# Patient Record
Sex: MALE | Race: WHITE | Employment: FULL TIME | URBAN - METROPOLITAN AREA
[De-identification: names, ages, dates, MRNs, and addresses within clinical notes are randomized per-mention and may not be internally consistent; named-entity substitution may affect disease eponyms.]

---

## 2018-08-05 ENCOUNTER — HOSPITAL ENCOUNTER (OUTPATIENT)
Age: 53
Discharge: HOME | End: 2018-08-05
Payer: COMMERCIAL

## 2018-08-05 PROCEDURE — 93005 ELECTROCARDIOGRAM TRACING: CPT | Performed by: NURSE PRACTITIONER

## 2018-08-06 PROCEDURE — 93010 ELECTROCARDIOGRAM REPORT: CPT | Performed by: INTERNAL MEDICINE

## 2018-09-21 LAB
EKG ATRIAL RATE: 61 BPM
EKG DIAGNOSIS: NORMAL
EKG P AXIS: 57 DEGREES
EKG P-R INTERVAL: 150 MS
EKG Q-T INTERVAL: 374 MS
EKG QRS DURATION: 84 MS
EKG QTC CALCULATION (BAZETT): 376 MS
EKG R AXIS: -13 DEGREES
EKG T AXIS: 21 DEGREES
EKG VENTRICULAR RATE: 61 BPM

## 2021-09-01 ENCOUNTER — APPOINTMENT (OUTPATIENT)
Dept: CT IMAGING | Age: 56
End: 2021-09-01
Payer: COMMERCIAL

## 2021-09-01 ENCOUNTER — APPOINTMENT (OUTPATIENT)
Dept: GENERAL RADIOLOGY | Age: 56
End: 2021-09-01
Payer: COMMERCIAL

## 2021-09-01 ENCOUNTER — HOSPITAL ENCOUNTER (EMERGENCY)
Age: 56
Discharge: HOME | End: 2021-09-01
Attending: STUDENT IN AN ORGANIZED HEALTH CARE EDUCATION/TRAINING PROGRAM
Payer: COMMERCIAL

## 2021-09-01 VITALS
OXYGEN SATURATION: 94 % | DIASTOLIC BLOOD PRESSURE: 96 MMHG | HEART RATE: 87 BPM | TEMPERATURE: 97.9 F | RESPIRATION RATE: 18 BRPM | SYSTOLIC BLOOD PRESSURE: 141 MMHG

## 2021-09-01 DIAGNOSIS — U07.1 PNEUMONIA DUE TO COVID-19 VIRUS: Primary | ICD-10-CM

## 2021-09-01 DIAGNOSIS — J12.82 PNEUMONIA DUE TO COVID-19 VIRUS: Primary | ICD-10-CM

## 2021-09-01 LAB
A/G RATIO: 1.2 (ref 1.1–2.2)
ALBUMIN SERPL-MCNC: 4.2 G/DL (ref 3.4–5)
ALP BLD-CCNC: 101 U/L (ref 40–129)
ALT SERPL-CCNC: 50 U/L (ref 10–40)
ANION GAP SERPL CALCULATED.3IONS-SCNC: 14 MMOL/L (ref 3–16)
AST SERPL-CCNC: 39 U/L (ref 15–37)
BASOPHILS ABSOLUTE: 0 K/UL (ref 0–0.2)
BASOPHILS RELATIVE PERCENT: 0.4 %
BILIRUB SERPL-MCNC: 0.5 MG/DL (ref 0–1)
BUN BLDV-MCNC: 9 MG/DL (ref 7–20)
CALCIUM SERPL-MCNC: 9.4 MG/DL (ref 8.3–10.6)
CHLORIDE BLD-SCNC: 99 MMOL/L (ref 99–110)
CO2: 26 MMOL/L (ref 21–32)
CREAT SERPL-MCNC: 0.8 MG/DL (ref 0.9–1.3)
EOSINOPHILS ABSOLUTE: 0.1 K/UL (ref 0–0.6)
EOSINOPHILS RELATIVE PERCENT: 1.6 %
GFR AFRICAN AMERICAN: >60
GFR NON-AFRICAN AMERICAN: >60
GLOBULIN: 3.6 G/DL
GLUCOSE BLD-MCNC: 177 MG/DL (ref 70–99)
HCT VFR BLD CALC: 44.3 % (ref 40.5–52.5)
HEMOGLOBIN: 14.7 G/DL (ref 13.5–17.5)
LYMPHOCYTES ABSOLUTE: 1 K/UL (ref 1–5.1)
LYMPHOCYTES RELATIVE PERCENT: 13.8 %
MCH RBC QN AUTO: 28.7 PG (ref 26–34)
MCHC RBC AUTO-ENTMCNC: 33.2 G/DL (ref 31–36)
MCV RBC AUTO: 86.4 FL (ref 80–100)
MONOCYTES ABSOLUTE: 0.8 K/UL (ref 0–1.3)
MONOCYTES RELATIVE PERCENT: 11.4 %
NEUTROPHILS ABSOLUTE: 5.1 K/UL (ref 1.7–7.7)
NEUTROPHILS RELATIVE PERCENT: 72.8 %
PDW BLD-RTO: 12.8 % (ref 12.4–15.4)
PLATELET # BLD: 286 K/UL (ref 135–450)
PMV BLD AUTO: 8.2 FL (ref 5–10.5)
POTASSIUM REFLEX MAGNESIUM: 4.2 MMOL/L (ref 3.5–5.1)
RBC # BLD: 5.12 M/UL (ref 4.2–5.9)
SODIUM BLD-SCNC: 139 MMOL/L (ref 136–145)
TOTAL PROTEIN: 7.8 G/DL (ref 6.4–8.2)
WBC # BLD: 7 K/UL (ref 4–11)

## 2021-09-01 PROCEDURE — 85025 COMPLETE CBC W/AUTO DIFF WBC: CPT

## 2021-09-01 PROCEDURE — 80053 COMPREHEN METABOLIC PANEL: CPT

## 2021-09-01 PROCEDURE — 96375 TX/PRO/DX INJ NEW DRUG ADDON: CPT

## 2021-09-01 PROCEDURE — 71260 CT THORAX DX C+: CPT

## 2021-09-01 PROCEDURE — 6360000004 HC RX CONTRAST MEDICATION: Performed by: STUDENT IN AN ORGANIZED HEALTH CARE EDUCATION/TRAINING PROGRAM

## 2021-09-01 PROCEDURE — 2580000003 HC RX 258: Performed by: STUDENT IN AN ORGANIZED HEALTH CARE EDUCATION/TRAINING PROGRAM

## 2021-09-01 PROCEDURE — 96374 THER/PROPH/DIAG INJ IV PUSH: CPT

## 2021-09-01 PROCEDURE — 99284 EMERGENCY DEPT VISIT MOD MDM: CPT

## 2021-09-01 PROCEDURE — 71045 X-RAY EXAM CHEST 1 VIEW: CPT

## 2021-09-01 PROCEDURE — 6360000002 HC RX W HCPCS: Performed by: STUDENT IN AN ORGANIZED HEALTH CARE EDUCATION/TRAINING PROGRAM

## 2021-09-01 RX ORDER — ATORVASTATIN CALCIUM 20 MG/1
20 TABLET, FILM COATED ORAL DAILY
COMMUNITY

## 2021-09-01 RX ORDER — ONDANSETRON 2 MG/ML
4 INJECTION INTRAMUSCULAR; INTRAVENOUS ONCE
Status: COMPLETED | OUTPATIENT
Start: 2021-09-01 | End: 2021-09-01

## 2021-09-01 RX ORDER — ONDANSETRON 4 MG/1
4 TABLET, ORALLY DISINTEGRATING ORAL EVERY 8 HOURS PRN
Qty: 9 TABLET | Refills: 0 | Status: SHIPPED | OUTPATIENT
Start: 2021-09-01 | End: 2021-09-04

## 2021-09-01 RX ORDER — KETOROLAC TROMETHAMINE 30 MG/ML
15 INJECTION, SOLUTION INTRAMUSCULAR; INTRAVENOUS ONCE
Status: COMPLETED | OUTPATIENT
Start: 2021-09-01 | End: 2021-09-01

## 2021-09-01 RX ORDER — 0.9 % SODIUM CHLORIDE 0.9 %
1000 INTRAVENOUS SOLUTION INTRAVENOUS ONCE
Status: COMPLETED | OUTPATIENT
Start: 2021-09-01 | End: 2021-09-01

## 2021-09-01 RX ADMIN — IOPAMIDOL 75 ML: 755 INJECTION, SOLUTION INTRAVENOUS at 13:16

## 2021-09-01 RX ADMIN — SODIUM CHLORIDE 1000 ML: 9 INJECTION, SOLUTION INTRAVENOUS at 12:11

## 2021-09-01 RX ADMIN — ONDANSETRON HYDROCHLORIDE 4 MG: 2 INJECTION, SOLUTION INTRAMUSCULAR; INTRAVENOUS at 12:10

## 2021-09-01 RX ADMIN — KETOROLAC TROMETHAMINE 15 MG: 30 INJECTION, SOLUTION INTRAMUSCULAR; INTRAVENOUS at 12:10

## 2021-09-01 ASSESSMENT — PAIN SCALES - GENERAL
PAINLEVEL_OUTOF10: 3
PAINLEVEL_OUTOF10: 4

## 2021-09-01 NOTE — ED PROVIDER NOTES
Activity:     Days of Exercise per Week:     Minutes of Exercise per Session:    Stress:     Feeling of Stress :    Social Connections:     Frequency of Communication with Friends and Family:     Frequency of Social Gatherings with Friends and Family:     Attends Restorationism Services:     Active Member of Clubs or Organizations:     Attends Club or Organization Meetings:     Marital Status:    Intimate Partner Violence:     Fear of Current or Ex-Partner:     Emotionally Abused:     Physically Abused:     Sexually Abused:      No current facility-administered medications for this encounter. Current Outpatient Medications   Medication Sig Dispense Refill    atorvastatin (LIPITOR) 20 MG tablet Take 20 mg by mouth daily      ondansetron (ZOFRAN ODT) 4 MG disintegrating tablet Take 1 tablet by mouth every 8 hours as needed for Nausea or Vomiting 9 tablet 0     No Known Allergies    REVIEW OF SYSTEMS  10 systems reviewed, pertinent positives per HPI otherwise noted to be negative. PHYSICAL EXAM  BP (!) 148/101   Pulse 88   Temp 97.9 °F (36.6 °C) (Temporal)   Resp 18   SpO2 95%    GENERAL APPEARANCE: Awake and alert. Cooperative. No acute distress. HENT: Normocephalic. Atraumatic. Mucous membranes are moist.  NECK: Supple. EYES: PERRL. EOM's grossly intact. HEART/CHEST: RRR. No murmurs. LUNGS: Respirations unlabored. CTAB. Good air exchange. Speaking comfortably in full sentences. ABDOMEN: No tenderness. Soft. Non-distended. No masses. No organomegaly. No guarding or rebound. MUSCULOSKELETAL: No extremity edema. Compartments soft. No deformity. No tenderness in the extremities. All extremities neurovascularly intact. SKIN: Warm and dry. No acute rashes. NEUROLOGICAL: Alert and oriented. CN's 2-12 intact. No gross facial drooping. Strength 5/5, sensation intact. PSYCHIATRIC: Normal mood and affect. LABS  I have reviewed all labs for this visit.    Results for orders placed or performed during the hospital encounter of 09/01/21   CBC Auto Differential   Result Value Ref Range    WBC 7.0 4.0 - 11.0 K/uL    RBC 5.12 4.20 - 5.90 M/uL    Hemoglobin 14.7 13.5 - 17.5 g/dL    Hematocrit 44.3 40.5 - 52.5 %    MCV 86.4 80.0 - 100.0 fL    MCH 28.7 26.0 - 34.0 pg    MCHC 33.2 31.0 - 36.0 g/dL    RDW 12.8 12.4 - 15.4 %    Platelets 834 937 - 872 K/uL    MPV 8.2 5.0 - 10.5 fL    Neutrophils % 72.8 %    Lymphocytes % 13.8 %    Monocytes % 11.4 %    Eosinophils % 1.6 %    Basophils % 0.4 %    Neutrophils Absolute 5.1 1.7 - 7.7 K/uL    Lymphocytes Absolute 1.0 1.0 - 5.1 K/uL    Monocytes Absolute 0.8 0.0 - 1.3 K/uL    Eosinophils Absolute 0.1 0.0 - 0.6 K/uL    Basophils Absolute 0.0 0.0 - 0.2 K/uL   Comprehensive Metabolic Panel w/ Reflex to MG   Result Value Ref Range    Sodium 139 136 - 145 mmol/L    Potassium reflex Magnesium 4.2 3.5 - 5.1 mmol/L    Chloride 99 99 - 110 mmol/L    CO2 26 21 - 32 mmol/L    Anion Gap 14 3 - 16    Glucose 177 (H) 70 - 99 mg/dL    BUN 9 7 - 20 mg/dL    CREATININE 0.8 (L) 0.9 - 1.3 mg/dL    GFR Non-African American >60 >60    GFR African American >60 >60    Calcium 9.4 8.3 - 10.6 mg/dL    Total Protein 7.8 6.4 - 8.2 g/dL    Albumin 4.2 3.4 - 5.0 g/dL    Albumin/Globulin Ratio 1.2 1.1 - 2.2    Total Bilirubin 0.5 0.0 - 1.0 mg/dL    Alkaline Phosphatase 101 40 - 129 U/L    ALT 50 (H) 10 - 40 U/L    AST 39 (H) 15 - 37 U/L    Globulin 3.6 g/dL     RADIOLOGY  CT CHEST W CONTRAST   Final Result   Moderate multifocal patchy airspace disease most typical of multifocal   pneumonia. This would include viral pneumonia given the pattern and   appearance. These findings are not typical of septic emboli. XR CHEST PORTABLE   Final Result   Multifocal opacities throughout both lungs likely represent multifocal   pneumonia, to include atypical viral causes.   However as some of the   opacities have a nodular appearance, recommend further characterization with   a dedicated chest CT, preferably contrast, to evaluate for possible septic   emboli or true pulmonary nodules. ED COURSE/MDM  Patient seen and evaluated. Old records reviewed. Labs and imaging reviewed and results discussed with patient. Patient is a 44-year-old male, with recent diagnosis of Covid, presenting with concerns for ongoing generalized malaise, decreased appetite, nausea. Full HPI as detailed above. Upon arrival in the ED, vitals reassuring. Patient is resting comfortably is in no acute distress. Heart regular rate and rhythm. Lungs clear to auscultation bilaterally. Not hypoxic in the department. Labs were performed and reassuring. No leukocytosis or anemia. No oxygen requirement. Chest x-ray with multifocal opacities consistent with multifocal pneumonia, consistent with patient's history of Covid. There was a nodular appearance of the opacities for which a dedicated CT chest was recommended, this did not show any evidence of septic emboli but did show ongoing findings of multifocal pneumonia do not suspect a bacterial component at this time, patient is still well within the time limit to be having symptoms from his Covid. Patient was treated with IV fluids, as well as Zofran. Was also given Toradol. Upon reassessment, patient had improvement of his symptoms and stated that he was feeling better. He will be sent with a pulse oximeter for use at home, advised to return to the ED if oxygen saturation's are less than 90%. Advised to continue taking his medications as prescribed, will also be sent with a prescription for Zofran for nausea. Patient is comfortable agreement with plan of care. Given strict return precautions for the ED. I did  him on obtaining Covid vaccination when he is eligible. Patient is comfortable agreement with plan of care and will be discharged home. Given return precautions.     During the patient's ED course, the patient was given:  Medications ketorolac (TORADOL) injection 15 mg (15 mg IntraVENous Given 9/1/21 1210)   ondansetron (ZOFRAN) injection 4 mg (4 mg IntraVENous Given 9/1/21 1210)   0.9 % sodium chloride bolus (0 mLs IntraVENous Stopped 9/1/21 1326)   iopamidol (ISOVUE-370) 76 % injection 75 mL (75 mLs IntraVENous Given 9/1/21 1316)        CLINICAL IMPRESSION  1. Pneumonia due to COVID-19 virus        Blood pressure (!) 148/101, pulse 88, temperature 97.9 °F (36.6 °C), temperature source Temporal, resp. rate 18, SpO2 95 %. DISPOSITION  Emeka Young was discharged to home in good condition. Patient was given scripts for the following medications. I counseled patient how to take these medications. New Prescriptions    ONDANSETRON (ZOFRAN ODT) 4 MG DISINTEGRATING TABLET    Take 1 tablet by mouth every 8 hours as needed for Nausea or Vomiting       Follow-up with:  Killian Ortega  [phone]  Schedule an appointment as soon as possible for a visit       [place] [facility] [place] [facility]  [address] [facility] [address]  Go to   If symptoms worsen      DISCLAIMER: This chart was created using Dragon dictation software. Efforts were made by me to ensure accuracy, however some errors may be present due to limitations of this technology and occasionally words are not transcribed correctly.        Mirta Aguirre MD  09/01/21 3537

## 2021-09-01 NOTE — ED TRIAGE NOTES
Pt started feeling sick on the 17th. Pt was diagnosed with COVID on the 23rd. Pt states that he has had increased SOB and cough, just doesn't feel like he is getting any better,.

## 2021-09-02 ENCOUNTER — CARE COORDINATION (OUTPATIENT)
Dept: CASE MANAGEMENT | Age: 56
End: 2021-09-02

## 2021-09-03 ENCOUNTER — CARE COORDINATION (OUTPATIENT)
Dept: CASE MANAGEMENT | Age: 56
End: 2021-09-03

## 2022-04-29 ENCOUNTER — APPOINTMENT (OUTPATIENT)
Dept: GENERAL RADIOLOGY | Age: 57
End: 2022-04-29
Payer: COMMERCIAL

## 2022-04-29 ENCOUNTER — HOSPITAL ENCOUNTER (OUTPATIENT)
Age: 57
Setting detail: OBSERVATION
Discharge: HOME | End: 2022-04-30
Attending: EMERGENCY MEDICINE | Admitting: INTERNAL MEDICINE
Payer: COMMERCIAL

## 2022-04-29 DIAGNOSIS — R07.9 CHEST PAIN, UNSPECIFIED TYPE: Primary | ICD-10-CM

## 2022-04-29 PROBLEM — K21.9 GERD (GASTROESOPHAGEAL REFLUX DISEASE): Status: ACTIVE | Noted: 2022-04-29

## 2022-04-29 PROBLEM — E66.9 OBESITY (BMI 30-39.9): Status: ACTIVE | Noted: 2022-04-29

## 2022-04-29 PROBLEM — E78.5 HLD (HYPERLIPIDEMIA): Status: ACTIVE | Noted: 2022-04-29

## 2022-04-29 PROBLEM — J44.9 OBSTRUCTIVE LUNG DISEASE (GENERALIZED) (HCC): Status: ACTIVE | Noted: 2022-04-29

## 2022-04-29 LAB
A/G RATIO: 1.7 (ref 1.1–2.2)
ALBUMIN SERPL-MCNC: 4.6 G/DL (ref 3.4–5)
ALP BLD-CCNC: 67 U/L (ref 40–129)
ALT SERPL-CCNC: 26 U/L (ref 10–40)
ANION GAP SERPL CALCULATED.3IONS-SCNC: 9 MMOL/L (ref 3–16)
AST SERPL-CCNC: 19 U/L (ref 15–37)
BASOPHILS ABSOLUTE: 0 K/UL (ref 0–0.2)
BASOPHILS RELATIVE PERCENT: 0.8 %
BILIRUB SERPL-MCNC: <0.2 MG/DL (ref 0–1)
BUN BLDV-MCNC: 18 MG/DL (ref 7–20)
CALCIUM SERPL-MCNC: 10 MG/DL (ref 8.3–10.6)
CHLORIDE BLD-SCNC: 98 MMOL/L (ref 99–110)
CO2: 25 MMOL/L (ref 21–32)
CREAT SERPL-MCNC: 0.8 MG/DL (ref 0.9–1.3)
EOSINOPHILS ABSOLUTE: 0.2 K/UL (ref 0–0.6)
EOSINOPHILS RELATIVE PERCENT: 2.7 %
GFR AFRICAN AMERICAN: >60
GFR NON-AFRICAN AMERICAN: >60
GLUCOSE BLD-MCNC: 224 MG/DL (ref 70–99)
HCT VFR BLD CALC: 41.8 % (ref 40.5–52.5)
HEMOGLOBIN: 14.1 G/DL (ref 13.5–17.5)
LYMPHOCYTES ABSOLUTE: 2.2 K/UL (ref 1–5.1)
LYMPHOCYTES RELATIVE PERCENT: 36.6 %
MCH RBC QN AUTO: 28.6 PG (ref 26–34)
MCHC RBC AUTO-ENTMCNC: 33.8 G/DL (ref 31–36)
MCV RBC AUTO: 84.5 FL (ref 80–100)
MONOCYTES ABSOLUTE: 0.5 K/UL (ref 0–1.3)
MONOCYTES RELATIVE PERCENT: 8.4 %
NEUTROPHILS ABSOLUTE: 3.1 K/UL (ref 1.7–7.7)
NEUTROPHILS RELATIVE PERCENT: 51.5 %
PDW BLD-RTO: 13.5 % (ref 12.4–15.4)
PLATELET # BLD: 261 K/UL (ref 135–450)
PMV BLD AUTO: 8.4 FL (ref 5–10.5)
POTASSIUM SERPL-SCNC: 4.2 MMOL/L (ref 3.5–5.1)
RBC # BLD: 4.94 M/UL (ref 4.2–5.9)
SODIUM BLD-SCNC: 132 MMOL/L (ref 136–145)
TOTAL PROTEIN: 7.3 G/DL (ref 6.4–8.2)
TROPONIN: <0.01 NG/ML
WBC # BLD: 6 K/UL (ref 4–11)

## 2022-04-29 PROCEDURE — 80053 COMPREHEN METABOLIC PANEL: CPT

## 2022-04-29 PROCEDURE — 71046 X-RAY EXAM CHEST 2 VIEWS: CPT

## 2022-04-29 PROCEDURE — 85025 COMPLETE CBC W/AUTO DIFF WBC: CPT

## 2022-04-29 PROCEDURE — 93005 ELECTROCARDIOGRAM TRACING: CPT | Performed by: EMERGENCY MEDICINE

## 2022-04-29 PROCEDURE — G0378 HOSPITAL OBSERVATION PER HR: HCPCS

## 2022-04-29 PROCEDURE — 99285 EMERGENCY DEPT VISIT HI MDM: CPT

## 2022-04-29 PROCEDURE — 1200000000 HC SEMI PRIVATE

## 2022-04-29 PROCEDURE — 84484 ASSAY OF TROPONIN QUANT: CPT

## 2022-04-29 PROCEDURE — 6370000000 HC RX 637 (ALT 250 FOR IP): Performed by: INTERNAL MEDICINE

## 2022-04-29 PROCEDURE — 6370000000 HC RX 637 (ALT 250 FOR IP): Performed by: PHYSICIAN ASSISTANT

## 2022-04-29 PROCEDURE — 2580000003 HC RX 258: Performed by: INTERNAL MEDICINE

## 2022-04-29 RX ORDER — ENOXAPARIN SODIUM 100 MG/ML
40 INJECTION SUBCUTANEOUS DAILY
Status: DISCONTINUED | OUTPATIENT
Start: 2022-04-30 | End: 2022-04-30 | Stop reason: HOSPADM

## 2022-04-29 RX ORDER — NITROGLYCERIN 0.4 MG/1
0.4 TABLET SUBLINGUAL EVERY 5 MIN PRN
Status: DISCONTINUED | OUTPATIENT
Start: 2022-04-29 | End: 2022-04-30 | Stop reason: HOSPADM

## 2022-04-29 RX ORDER — SODIUM CHLORIDE 0.9 % (FLUSH) 0.9 %
5-40 SYRINGE (ML) INJECTION PRN
Status: DISCONTINUED | OUTPATIENT
Start: 2022-04-29 | End: 2022-04-30 | Stop reason: HOSPADM

## 2022-04-29 RX ORDER — ATORVASTATIN CALCIUM 10 MG/1
20 TABLET, FILM COATED ORAL NIGHTLY
Status: DISCONTINUED | OUTPATIENT
Start: 2022-04-29 | End: 2022-04-30 | Stop reason: HOSPADM

## 2022-04-29 RX ORDER — ACETAMINOPHEN 650 MG/1
650 SUPPOSITORY RECTAL EVERY 6 HOURS PRN
Status: DISCONTINUED | OUTPATIENT
Start: 2022-04-29 | End: 2022-04-30 | Stop reason: HOSPADM

## 2022-04-29 RX ORDER — ASPIRIN 81 MG/1
81 TABLET ORAL DAILY
COMMUNITY

## 2022-04-29 RX ORDER — SODIUM CHLORIDE 0.9 % (FLUSH) 0.9 %
5-40 SYRINGE (ML) INJECTION EVERY 12 HOURS SCHEDULED
Status: DISCONTINUED | OUTPATIENT
Start: 2022-04-29 | End: 2022-04-30 | Stop reason: HOSPADM

## 2022-04-29 RX ORDER — SODIUM CHLORIDE 9 MG/ML
INJECTION, SOLUTION INTRAVENOUS PRN
Status: DISCONTINUED | OUTPATIENT
Start: 2022-04-29 | End: 2022-04-30 | Stop reason: HOSPADM

## 2022-04-29 RX ORDER — ALBUTEROL SULFATE 2.5 MG/3ML
2.5 SOLUTION RESPIRATORY (INHALATION)
Status: DISCONTINUED | OUTPATIENT
Start: 2022-04-30 | End: 2022-04-30 | Stop reason: HOSPADM

## 2022-04-29 RX ORDER — ALBUTEROL SULFATE 2.5 MG/3ML
2.5 SOLUTION RESPIRATORY (INHALATION) EVERY 4 HOURS PRN
Status: DISCONTINUED | OUTPATIENT
Start: 2022-04-29 | End: 2022-04-30 | Stop reason: HOSPADM

## 2022-04-29 RX ORDER — ASPIRIN 81 MG/1
81 TABLET ORAL DAILY
Status: DISCONTINUED | OUTPATIENT
Start: 2022-04-30 | End: 2022-04-30 | Stop reason: HOSPADM

## 2022-04-29 RX ORDER — ACETAMINOPHEN 325 MG/1
650 TABLET ORAL EVERY 6 HOURS PRN
Status: DISCONTINUED | OUTPATIENT
Start: 2022-04-29 | End: 2022-04-30 | Stop reason: HOSPADM

## 2022-04-29 RX ORDER — ASPIRIN 81 MG/1
324 TABLET, CHEWABLE ORAL ONCE
Status: COMPLETED | OUTPATIENT
Start: 2022-04-29 | End: 2022-04-29

## 2022-04-29 RX ORDER — PANTOPRAZOLE SODIUM 40 MG/1
40 TABLET, DELAYED RELEASE ORAL DAILY
Status: DISCONTINUED | OUTPATIENT
Start: 2022-04-30 | End: 2022-04-30 | Stop reason: HOSPADM

## 2022-04-29 RX ORDER — ONDANSETRON 2 MG/ML
4 INJECTION INTRAMUSCULAR; INTRAVENOUS EVERY 6 HOURS PRN
Status: DISCONTINUED | OUTPATIENT
Start: 2022-04-29 | End: 2022-04-30 | Stop reason: HOSPADM

## 2022-04-29 RX ORDER — POLYETHYLENE GLYCOL 3350 17 G/17G
17 POWDER, FOR SOLUTION ORAL DAILY PRN
Status: DISCONTINUED | OUTPATIENT
Start: 2022-04-29 | End: 2022-04-30 | Stop reason: HOSPADM

## 2022-04-29 RX ORDER — PANTOPRAZOLE SODIUM 40 MG/1
40 TABLET, DELAYED RELEASE ORAL DAILY
COMMUNITY

## 2022-04-29 RX ORDER — ONDANSETRON 4 MG/1
4 TABLET, ORALLY DISINTEGRATING ORAL EVERY 8 HOURS PRN
Status: DISCONTINUED | OUTPATIENT
Start: 2022-04-29 | End: 2022-04-30 | Stop reason: HOSPADM

## 2022-04-29 RX ADMIN — ATORVASTATIN CALCIUM 20 MG: 10 TABLET, FILM COATED ORAL at 23:40

## 2022-04-29 RX ADMIN — SODIUM CHLORIDE, PRESERVATIVE FREE 10 ML: 5 INJECTION INTRAVENOUS at 23:40

## 2022-04-29 RX ADMIN — ASPIRIN 81 MG 324 MG: 81 TABLET ORAL at 20:33

## 2022-04-29 ASSESSMENT — PAIN - FUNCTIONAL ASSESSMENT
PAIN_FUNCTIONAL_ASSESSMENT: NONE - DENIES PAIN
PAIN_FUNCTIONAL_ASSESSMENT: NONE - DENIES PAIN

## 2022-04-29 NOTE — ED PROVIDER NOTES
[facility]  ED      CHIEF COMPLAINT  Chest Pain (per pt x2 days ago center of chest radiates up into throat/head/sent in by PCP)      [address]  I have seen and evaluated this patient with my supervising physician, Dr. Bee Nagel. HISTORY OF PRESENT ILLNESS  Rodrigo Roe is a 49 y.o. male history HTN, HLD, BMI 28, family history of cardiac disease; presents emergency department for evaluation of chest pain. Patient reports of the past few weeks he had a chest cold which seem to have resolved however over the past few days he noticed intermittent chest pain that starts in the center of his chest and radiates up towards his neck and head. 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 Chest tightness does seem to improve at rest.  Patient states when he is having this tightness he feels like he cannot take a full breath. He went to urgent care today who referred him to the ED. No recent flights, surgeries, hemoptysis or unilateral leg swelling. No prior cardiac evaluation. This moment at rest patient is chest pain-free no other complaints, modifying factors or associated symptoms. Nursing notes reviewed. Past Medical History:   Diagnosis Date    Hyperlipidemia     Hypertension      Past Surgical History:   Procedure Laterality Date    APPENDECTOMY      VASECTOMY       History reviewed. No pertinent family history.   Social History     Socioeconomic History    Marital status:      Spouse name: Not on file    Number of children: Not on file    Years of education: Not on file    Highest education level: Not on file   Occupational History    Not on file   Tobacco Use    Smoking status: Never Smoker    Smokeless tobacco: Never Used   Substance and Sexual Activity    Alcohol use: Not on file     Comment: rarely    Drug use: Never    Sexual activity: Not on file   Other Topics Concern    Not on file   Social History Narrative    Not on file     Social Determinants of Health     Financial Resource Strain:     Difficulty of Paying Living Expenses: Not on file   Food Insecurity:     Worried About Running Out of Food in the Last Year: Not on file    Karen of Food in the Last Year: Not on file   Transportation Needs:     Lack of Transportation (Medical): Not on file    Lack of Transportation (Non-Medical):  Not on file   Physical Activity:     Days of Exercise per Week: Not on file    Minutes of Exercise per Session: Not on file   Stress:     Feeling of Stress : Not on file   Social Connections:     Frequency of Communication with Friends and Family: Not on file    Frequency of Social Gatherings with Friends and Family: Not on file    Attends Hinduism Services: Not on file    Active Member of [address] [place] [address] [employer] or Organizations: Not on file    Attends Club or Organization Meetings: Not on file    Marital Status: Not on file   Intimate Partner Violence:     Fear of Current or Ex-Partner: Not on file    Emotionally Abused: Not on file    Physically Abused: Not on file    Sexually Abused: Not on file   Housing Stability:     Unable to Pay for Housing in the Last Year: Not on file    Number of [place] in the Last Year: Not on file    Unstable Housing in the Last Year: Not on file     Current Facility-Administered Medications   Medication Dose Route Frequency Provider Last Rate Last Admin    aspirin chewable tablet 324 mg  324 mg Oral Once Shun Myers PA-C         Current Outpatient Medications   Medication Sig Dispense Refill    pantoprazole (PROTONIX) 40 MG tablet Take 40 mg by mouth daily      aspirin 81 MG EC tablet Take 81 mg by mouth daily      atorvastatin (LIPITOR) 20 MG tablet Take 20 mg by mouth daily       No Known Allergies    REVIEW OF SYSTEMS  10 systems reviewed, pertinent positives per HPI otherwise noted to be negative    PHYSICAL EXAM  BP (!) 149/104   Pulse 76   Temp 98.6 °F (37 °C) (Oral)   Resp 16   Ht 5' 8\" (1.727 m)   Wt 215 lb (97.5 kg)   SpO2 97%   BMI 32.69 kg/m²   GENERAL APPEARANCE: Awake and alert. Cooperative. HEAD: Normocephalic. Atraumatic. EYES: EOM's grossly intact. ENT: Mucous membranes are moist.   NECK: Supple. HEART: RRR. No murmurs. LUNGS: Respirations unlabored. CTAB. Good air exchange. Speaking comfortably in full sentences. EXTREMITIES: No peripheral edema. Moves all extremities equally. All extremities neurovascularly intact. No unilateral leg swelling  SKIN: Warm and dry. No acute rashes. NEUROLOGICAL: Alert and oriented. CN's 2-12 intact. No gross facial drooping. Strength 5/5, sensation intact. PSYCHIATRIC: Normal mood and affect. RADIOLOGY  XR CHEST (2 VW)   Final Result   The lungs are without acute focal process. There is no effusion or   pneumothorax. The cardiomediastinal silhouette is stable. The osseous   structures are stable. IMPRESSION:   No acute process. LABS  Labs Reviewed   COMPREHENSIVE METABOLIC PANEL - Abnormal; Notable for the following components:       Result Value    Sodium 132 (*)     Chloride 98 (*)     Glucose 224 (*)     CREATININE 0.8 (*)     All other components within normal limits   CBC WITH AUTO DIFFERENTIAL   TROPONIN       PROCEDURES  Unless otherwise noted below, none  Procedures       CRITICAL CARE TIME  The total critical care time spent while evaluating and treating this patient was 0 minutes. This excludes time spent doing separately billable procedures. This includes time at the bedside, data interpretation, medication management, obtaining critical history from collateral sources if the patient is unable to provide it directly, and physician consultation. Specifics of interventions taken and potentially life-threatening diagnostic considerations are listed above in the medical decision making.     MDM  MDM  72-year-old male history of HTN, HLD, BMI 28, family history of cardiac disease presents ED for evaluation of intermittent chest tightness. He does state it does seem to happen more frequently with exertion and less with rest.  No prior cardiac evaluation was referred to ED by urgent care. On arrival to ED patient is at rest chest pain-free. Hypertensive 174/108, afebrile nontachycardic oxygen saturation 97% on room air. Patient is no acute distress. Obtain lab work as well as chest x-ray. Please refer to attending note for EKG interpretation. Patient's heart score is documented below. Discussed with the patient at length including discussions with the attending physician and the patient. However given his heart score 4 no prior cardiac evaluations will plan to return to the hospital service to request admission for chest pain rule out ACS and stress testing. Patient received aspirin in the ED. HEART Score for Major Cardiac Events from [email or web address]  on 4/29/2022  ** All calculations should be rechecked by clinician prior to use **    RESULT SUMMARY:  4 points  Moderate Score (4-6 points)    Risk of MACE of 12-16.6%. INPUTS:  History --> 1 = Moderately suspicious  EKG --> 0 = Normal  Age --> 1 = 45-64  Risk factors --> 2 = ?3 risk factors or history of atherosclerotic disease  Initial troponin --> 0 = ?normal limit      DISPOSITION  Requesting admission for chest pain rule out ACS    CLINICAL IMPRESSION  1.  Chest pain, unspecified type            Myranda Jimenes PA-C  04/29/22 2031

## 2022-04-30 VITALS
OXYGEN SATURATION: 95 % | SYSTOLIC BLOOD PRESSURE: 128 MMHG | RESPIRATION RATE: 16 BRPM | DIASTOLIC BLOOD PRESSURE: 86 MMHG | HEIGHT: 68 IN | BODY MASS INDEX: 32.58 KG/M2 | WEIGHT: 215 LBS | TEMPERATURE: 97.7 F | HEART RATE: 79 BPM

## 2022-04-30 LAB
ANION GAP SERPL CALCULATED.3IONS-SCNC: 8 MMOL/L (ref 3–16)
BUN BLDV-MCNC: 15 MG/DL (ref 7–20)
CALCIUM SERPL-MCNC: 10.1 MG/DL (ref 8.3–10.6)
CHLORIDE BLD-SCNC: 103 MMOL/L (ref 99–110)
CHOLESTEROL, TOTAL: 199 MG/DL (ref 0–199)
CO2: 28 MMOL/L (ref 21–32)
CREAT SERPL-MCNC: 0.8 MG/DL (ref 0.9–1.3)
EKG ATRIAL RATE: 65 BPM
EKG ATRIAL RATE: 81 BPM
EKG DIAGNOSIS: NORMAL
EKG DIAGNOSIS: NORMAL
EKG P AXIS: 41 DEGREES
EKG P AXIS: 43 DEGREES
EKG P-R INTERVAL: 154 MS
EKG P-R INTERVAL: 158 MS
EKG Q-T INTERVAL: 348 MS
EKG Q-T INTERVAL: 382 MS
EKG QRS DURATION: 78 MS
EKG QRS DURATION: 84 MS
EKG QTC CALCULATION (BAZETT): 397 MS
EKG QTC CALCULATION (BAZETT): 404 MS
EKG R AXIS: -16 DEGREES
EKG R AXIS: -28 DEGREES
EKG T AXIS: 5 DEGREES
EKG T AXIS: 6 DEGREES
EKG VENTRICULAR RATE: 65 BPM
EKG VENTRICULAR RATE: 81 BPM
GFR AFRICAN AMERICAN: >60
GFR NON-AFRICAN AMERICAN: >60
GLUCOSE BLD-MCNC: 162 MG/DL (ref 70–99)
HCT VFR BLD CALC: 44.8 % (ref 40.5–52.5)
HDLC SERPL-MCNC: 36 MG/DL (ref 40–60)
HEMOGLOBIN: 14.8 G/DL (ref 13.5–17.5)
LDL CHOLESTEROL CALCULATED: 121 MG/DL
MCH RBC QN AUTO: 28.4 PG (ref 26–34)
MCHC RBC AUTO-ENTMCNC: 33.1 G/DL (ref 31–36)
MCV RBC AUTO: 85.7 FL (ref 80–100)
PDW BLD-RTO: 13.5 % (ref 12.4–15.4)
PLATELET # BLD: 266 K/UL (ref 135–450)
PMV BLD AUTO: 8.2 FL (ref 5–10.5)
POTASSIUM REFLEX MAGNESIUM: 4.9 MMOL/L (ref 3.5–5.1)
RBC # BLD: 5.23 M/UL (ref 4.2–5.9)
SODIUM BLD-SCNC: 139 MMOL/L (ref 136–145)
TRIGL SERPL-MCNC: 209 MG/DL (ref 0–150)
TROPONIN: <0.01 NG/ML
VLDLC SERPL CALC-MCNC: 42 MG/DL
WBC # BLD: 5.9 K/UL (ref 4–11)

## 2022-04-30 PROCEDURE — 6370000000 HC RX 637 (ALT 250 FOR IP): Performed by: INTERNAL MEDICINE

## 2022-04-30 PROCEDURE — G0378 HOSPITAL OBSERVATION PER HR: HCPCS

## 2022-04-30 PROCEDURE — 96372 THER/PROPH/DIAG INJ SC/IM: CPT

## 2022-04-30 PROCEDURE — 83036 HEMOGLOBIN GLYCOSYLATED A1C: CPT

## 2022-04-30 PROCEDURE — 36415 COLL VENOUS BLD VENIPUNCTURE: CPT

## 2022-04-30 PROCEDURE — 84484 ASSAY OF TROPONIN QUANT: CPT

## 2022-04-30 PROCEDURE — 85027 COMPLETE CBC AUTOMATED: CPT

## 2022-04-30 PROCEDURE — 80048 BASIC METABOLIC PNL TOTAL CA: CPT

## 2022-04-30 PROCEDURE — 80061 LIPID PANEL: CPT

## 2022-04-30 PROCEDURE — 93010 ELECTROCARDIOGRAM REPORT: CPT | Performed by: INTERNAL MEDICINE

## 2022-04-30 PROCEDURE — 93005 ELECTROCARDIOGRAM TRACING: CPT | Performed by: INTERNAL MEDICINE

## 2022-04-30 PROCEDURE — 6360000002 HC RX W HCPCS: Performed by: INTERNAL MEDICINE

## 2022-04-30 RX ADMIN — PANTOPRAZOLE SODIUM 40 MG: 40 TABLET, DELAYED RELEASE ORAL at 09:34

## 2022-04-30 RX ADMIN — ENOXAPARIN SODIUM 40 MG: 100 INJECTION SUBCUTANEOUS at 09:34

## 2022-04-30 RX ADMIN — ASPIRIN 81 MG: 81 TABLET, COATED ORAL at 09:34

## 2022-04-30 NOTE — H&P
Hospital Medicine History & Physical      Patient:  Stan Kemp  :   1965  MRN:   [id number]  Date of Service: 22    Chief Complaint   Patient presents with    Chest Pain     per pt x2 days ago center of chest radiates up into throat/head/sent in by PCP       HISTORY OF PRESENT ILLNESS:    Stan Kemp is a 49 y.o. male. He presented to the ER after completing his work day. He reports on and off mild central chest and neck discomfort for several days. Comes and goes spontaenously. It generally lasts for minutes but less than an hour. It does not change with activity, rest, breathing, swallowing, position, etc... Has not taken SL NTG. When it happens it feels like \"I can get just short of a good cleansing breath. \"  No flushing, diaphoresis, or nausea. Has tried albuterol which he states helps partially. He may have had childhood asthma. He seems to recall definitely having exercise-induced asthma. He is not chronically treated for either. He had COVID-19 back in . He had dyspnea, cough, and chest pain persisting long after COVID had resolved for which he was prescribed prn albuterol. He has not had PFT's. Review of Systems:  All pertinent positives and negatives are as noted in the HPI section. All other systems were reviewed and are negative. Past Medical History:   Diagnosis Date    Hyperlipidemia     Hypertension        Past Surgical History:   Procedure Laterality Date    APPENDECTOMY      VASECTOMY           Prior to Admission medications    Medication Sig Start Date End Date Taking? Authorizing Provider   pantoprazole (PROTONIX) 40 MG tablet Take 40 mg by mouth daily   Yes Historical Provider, MD   aspirin 81 MG EC tablet Take 81 mg by mouth daily   Yes Historical Provider, MD   atorvastatin (LIPITOR) 20 MG tablet Take 20 mg by mouth daily    Historical Provider, MD       Allergies:   Patient has no known allergies.     Social:   reports that he has never smoked. He has never used smokeless tobacco.   has no history on file for alcohol use. Social History     Substance and Sexual Activity   Drug Use Never       History reviewed. No pertinent family history. Father MI at 80  Mother MI at 79    PHYSICAL EXAM:  I performed this physical examination. Vitals:  Patient Vitals for the past 24 hrs:   BP Temp Temp src Pulse Resp SpO2 Height Weight   04/29/22 2003 (!) 149/104 -- -- 76 16 97 % -- --   04/29/22 1811 (!) 174/108 98.6 °F (37 °C) Oral 86 16 97 % -- --   04/29/22 1803 -- -- -- -- -- -- 5' 8\" (1.727 m) 215 lb (97.5 kg)       GEN:  Appearance:  Age appropriate male in NAD . Level of Consciousness:  alert . Orientation:  full    HEENT: Sclera anicteric.  no conjunctival chemosis. moist mucus membranes. no specific or diagnostic oral lesions. NECK:  no signs of meningismus. Jugular veins not distended. Carotid pulses  2+.  no cervical lymphadenopathy. no thyromegaly. CV:  regular rhythm. normal S1 & S2.    no murmur. no rub.  no gallop. PULM:  Chest excursion is symmetric. Breath sounds are vesicular. Adventitious sounds:  Prolonged expiratory phase with pan-expiratory wheezing. AB:  Abdominal shape is normal.  Bowel sounds are active. Generally soft to palpation. no tenderness is present. no involuntary guarding. no rebound guarding. EXTR:  Skin is warm. Capillary refill brisk. no specific or pathognomic rash. no clubbing. no pitting edema. no active wound or ulcer.   Pulses 2 + x 4      LABS:  Lab Results   Component Value Date    WBC 6.0 04/29/2022    HGB 14.1 04/29/2022    HCT 41.8 04/29/2022    MCV 84.5 04/29/2022     04/29/2022     Lab Results   Component Value Date    CREATININE 0.8 (L) 04/29/2022    BUN 18 04/29/2022     (L) 04/29/2022    K 4.2 04/29/2022    CL 98 (L) 04/29/2022    CO2 25 04/29/2022     Lab Results   Component Value Date    ALT 26 04/29/2022    AST 19 04/29/2022 ALKPHOS 67 04/29/2022    BILITOT <0.2 04/29/2022     Lab Results   Component Value Date    TROPONINI <0.01 04/29/2022     No results for input(s): PHART, KEE9IKI, PO2ART in the last 72 hours. IMAGING:  XR CHEST (2 VW)    Result Date: 4/29/2022  EXAMINATION: TWO XRAY VIEWS OF THE CHEST 4/29/2022 6:51 pm COMPARISON: 09/01/2021 HISTORY: ORDERING SYSTEM PROVIDED HISTORY: chest pain TECHNOLOGIST PROVIDED HISTORY: Reason for exam:->chest pain Reason for Exam: cp     The lungs are without acute focal process. There is no effusion or pneumothorax. The cardiomediastinal silhouette is stable. The osseous structures are stable. IMPRESSION: No acute process. I directly reviewed all recent imaging studies as well as pertinent prior studies. Radiology reports may or may not be available at the time of my review. EKG:  New and pertinent prior tracings were directly reviewed. My interpretation is as follows:  Normal sinus rhythm. Active Hospital Problems    Diagnosis Date Noted    Obesity (BMI 30-39. 9) [E66.9] 04/29/2022     Priority: Medium    GERD (gastroesophageal reflux disease) [K21.9] 04/29/2022     Priority: Medium    HLD (hyperlipidemia) [E78.5] 04/29/2022     Priority: Medium    Chest pain in adult [R07.9] 04/29/2022     Priority: Medium    Obstructive lung disease (generalized) ([facility] [address] [J44.9] 04/29/2022     Priority: Medium       ASSESSMENT & PLAN  Chest Pain  -  Atypical for cardiac angina which is intermediate risk in a male over 40. This could be done as an inpatient or outpatient. Patient prefers to stay in the hospital to expedite testing. Will complete risk stratification with exercise stress w/ MPI tomorrow. Obstructive lung disease  -  Patient has exam e/o expiratory airflow obstruction (wheezing, bronchospasm) at rest.  He relates a remote h/o exercise-induced asthma. Will make short acting bronchodilators available.   Will be sure to give an albuterol neb 30 minutes prior to exercise stress test.  -  Outpatient PFT's are recommended. GERD, HLD  -  Continue home pantoprazole and statin. DVT prophylaxis: SCDs, lovenox  Code Status:  Full  Disposition:  Observation. Anticipate d/c to home in 1 day.     Hafsa Tyson MD MD

## 2022-04-30 NOTE — ED PROVIDER NOTES
I independently examined and evaluated Breana Leonard. All diagnostic, treatment, and disposition decisions were made by myself in conjunction with the DEMINA, Tasha [employer]est[employer]fluence. For all further details of the patient's emergency department visit, please see their documentation. 70-year-old male with history of hypertension, hyperlipidemia and obesity also with a family history of cardiac disease presents with chest pain. He states for the past couple weeks he has had a chest cold and over the past few days he has noticed some intermittent chest pain in the center of his chest that radiates to his neck. 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  It does get slightly better with rest.  He states when he is having the symptoms he feels like he cannot get a deep breath but he does not feel short of breath. No nausea or vomiting. He has never had a cardiac work-up. No history of stress test.      Vitals:    04/30/22 1122   BP: 128/86   Pulse: 79   Resp: 16   Temp: 97.7 °F (36.5 °C)   SpO2: 95%   Here the patient is in no acute distress. Heart is regular rate and rhythm, no respiratory distress.     Results for orders placed or performed during the hospital encounter of 04/29/22   CBC with Auto Differential   Result Value Ref Range    WBC 6.0 4.0 - 11.0 K/uL    RBC 4.94 4.20 - 5.90 M/uL    Hemoglobin 14.1 13.5 - 17.5 g/dL    Hematocrit 41.8 40.5 - 52.5 %    MCV 84.5 80.0 - 100.0 fL    MCH 28.6 26.0 - 34.0 pg    MCHC 33.8 31.0 - 36.0 g/dL    RDW 13.5 12.4 - 15.4 %    Platelets 292 413 - 375 K/uL    MPV 8.4 5.0 - 10.5 fL    Neutrophils % 51.5 %    Lymphocytes % 36.6 %    Monocytes % 8.4 %    Eosinophils % 2.7 %    Basophils % 0.8 %    Neutrophils Absolute 3.1 1.7 - 7.7 K/uL    Lymphocytes Absolute 2.2 1.0 - 5.1 K/uL    Monocytes Absolute 0.5 0.0 - 1.3 K/uL    Eosinophils Absolute 0.2 0.0 - 0.6 K/uL    Basophils Absolute 0.0 0.0 - 0.2 K/uL   Comprehensive Metabolic Panel   Result Value Ref Range    Sodium 132 (L) 136 - 145 mmol/L    Potassium 4.2 3.5 - 5.1 mmol/L    Chloride 98 (L) 99 - 110 mmol/L    CO2 25 21 - 32 mmol/L    Anion Gap 9 3 - 16    Glucose 224 (H) 70 - 99 mg/dL    BUN 18 7 - 20 mg/dL    CREATININE 0.8 (L) 0.9 - 1.3 mg/dL    GFR Non-African American >60 >60    GFR African American >60 >60    Calcium 10.0 8.3 - 10.6 mg/dL    Total Protein 7.3 6.4 - 8.2 g/dL    Albumin 4.6 3.4 - 5.0 g/dL    Albumin/Globulin Ratio 1.7 1.1 - 2.2    Total Bilirubin <0.2 0.0 - 1.0 mg/dL    Alkaline Phosphatase 67 40 - 129 U/L    ALT 26 10 - 40 U/L    AST 19 15 - 37 U/L   Troponin   Result Value Ref Range    Troponin <0.01 <0.01 ng/mL   CBC   Result Value Ref Range    WBC 5.9 4.0 - 11.0 K/uL    RBC 5.23 4.20 - 5.90 M/uL    Hemoglobin 14.8 13.5 - 17.5 g/dL    Hematocrit 44.8 40.5 - 52.5 %    MCV 85.7 80.0 - 100.0 fL    MCH 28.4 26.0 - 34.0 pg    MCHC 33.1 31.0 - 36.0 g/dL    RDW 13.5 12.4 - 15.4 %    Platelets 496 783 - 417 K/uL    MPV 8.2 5.0 - 10.5 fL   Basic Metabolic Panel w/ Reflex to MG   Result Value Ref Range    Sodium 139 136 - 145 mmol/L    Potassium reflex Magnesium 4.9 3.5 - 5.1 mmol/L    Chloride 103 99 - 110 mmol/L    CO2 28 21 - 32 mmol/L    Anion Gap 8 3 - 16    Glucose 162 (H) 70 - 99 mg/dL    BUN 15 7 - 20 mg/dL    CREATININE 0.8 (L) 0.9 - 1.3 mg/dL    GFR Non-African American >60 >60    GFR African American >60 >60    Calcium 10.1 8.3 - 10.6 mg/dL   Lipid panel - fasting   Result Value Ref Range    Cholesterol, Total 199 0 - 199 mg/dL    Triglycerides 209 (H) 0 - 150 mg/dL    HDL 36 (L) 40 - 60 mg/dL    LDL Calculated 121 (H) <100 mg/dL    VLDL Cholesterol Calculated 42 Not Established mg/dL   Troponin   Result Value Ref Range    Troponin <0.01 <0.01 ng/mL   Troponin   Result Value Ref Range    Troponin <0.01 <0.01 ng/mL   Troponin   Result Value Ref Range    Troponin <0.01 <0.01 ng/mL   EKG 12 Lead   Result Value Ref Range    Ventricular Rate 81 BPM    Atrial Rate 81 BPM    P-R Interval 154 ms    QRS Duration 78 ms    Q-T Interval 348 ms    QTc Calculation (Bazett) 404 ms    P Axis 43 degrees    R Axis -28 degrees    T Axis 5 degrees    Diagnosis       Normal sinus rhythmNormal ECGWhen compared with ECG of 05-AUG-2018 12:00,No significant change was foundConfirmed by VICKY STEIN MD [id number]) on 4/30/2022 1:41:36 PM   EKG 12 lead   Result Value Ref Range    Ventricular Rate 65 BPM    Atrial Rate 65 BPM    P-R Interval 158 ms    QRS Duration 84 ms    Q-T Interval 382 ms    QTc Calculation (Bazett) 397 ms    P Axis 41 degrees    R Axis -16 degrees    T Axis 6 degrees    Diagnosis       Normal sinus rhythmNormal ECGWhen compared with ECG of 29-APR-2022 18:05,No significant change was foundConfirmed by VICKY STEIN MD ([id number]) on 4/30/2022 1:41:39 PM       XR CHEST (2 VW)   Final Result   The lungs are without acute focal process. There is no effusion or   pneumothorax. The cardiomediastinal silhouette is stable. The osseous   structures are stable. IMPRESSION:   No acute process. NM MYOCARDIAL SPECT REST EXERCISE OR RX    (Results Pending)         EKG  The Ekg interpreted by myself  normal sinus rhythm with a rate of 81  Axis is   Normal  QTc is  normal  Intervals and Durations are unremarkable. No specific ST-T wave changes appreciated. No evidence of acute ischemia. No prior EKG for comparison. HEART SCORE:  History: +2 high suspicion, +1 moderate, 0 low  EKG: +2 significant ST depression, +1 nonspec changes, 0 normal  Age: +2 >65, +1 45-65, 0 <45  Risk factors: +2 >3 or known CAD, +1 1-2, 0 none (factors = HLD, HTN, DM, tobacco, obesity, FHx)  Troponin: +2 >3x normal limit, +1 1-3x normal limit, 0 neg    Heart score: 4    I personally saw and performed a substantive portion of the visit including all aspects of the medical decision making. MDM:    Here the patient is currently asymptomatic. EKG is normal sinus rhythm with no ischemic changes.   Troponin is negative, lab work is unremarkable, chest x-ray is normal.  He has a heart score of 4. I spoke with the patient at length and we discussed different options. After further discussion with the patient we have decided to admit him for further evaluation and inpatient stress test.  He was given a full dose aspirin here.            Jerry Silva MD  05/01/22 [id number]

## 2022-04-30 NOTE — PROGRESS NOTES
Hospitalist Progress Note      PCP: No primary care provider on file. Date of Admission: 4/29/2022    Chief Complaint: chest pain    Hospital Course: reviewed     Subjective: no cp since yesterday       Medications:  Reviewed    Infusion Medications    sodium chloride       Scheduled Medications    aspirin  81 mg Oral Daily    atorvastatin  20 mg Oral Nightly    pantoprazole  40 mg Oral Daily    sodium chloride flush  5-40 mL IntraVENous 2 times per day    enoxaparin  40 mg SubCUTAneous Daily     PRN Meds: albuterol, albuterol, sodium chloride flush, sodium chloride, ondansetron **OR** ondansetron, acetaminophen **OR** acetaminophen, polyethylene glycol, nitroGLYCERIN      Intake/Output Summary (Last 24 hours) at 4/30/2022 1451  Last data filed at 4/29/2022 2212  Gross per 24 hour   Intake 120 ml   Output --   Net 120 ml       Physical Exam Performed:    /86   Pulse 79   Temp 97.7 °F (36.5 °C) (Oral)   Resp 16   Ht 5' 8\" (1.727 m)   Wt 215 lb (97.5 kg)   SpO2 95%   BMI 32.69 kg/m²     General appearance: No apparent distress, appears stated age and cooperative. HEENT: Pupils equal, round, and reactive to light. Conjunctivae/corneas clear. Neck: Supple, with full range of motion. No jugular venous distention. Trachea midline. Respiratory:  Normal respiratory effort. Clear to auscultation, bilaterally without Rales/Wheezes/Rhonchi. Cardiovascular: Regular rate and rhythm with normal S1/S2 without murmurs, rubs or gallops. Abdomen: Soft, non-tender, non-distended with normal bowel sounds. Musculoskeletal: No clubbing, cyanosis or edema bilaterally. Full range of motion without deformity. Skin: Skin color, texture, turgor normal.  No rashes or lesions. Neurologic:  Neurovascularly intact without any focal sensory/motor deficits.  Cranial nerves: II-XII intact, grossly non-focal.  Psychiatric: Alert and oriented, thought content appropriate, normal insight  Capillary Refill: Brisk,3 seconds, normal   Peripheral Pulses: +2 palpable, equal bilaterally       Labs:   Recent Labs     04/29/22 1825 04/30/22  0631   WBC 6.0 5.9   HGB 14.1 14.8   HCT 41.8 44.8    266     Recent Labs     04/29/22  1825 04/30/22  0631   * 139   K 4.2 4.9   CL 98* 103   CO2 25 28   BUN 18 15   CREATININE 0.8* 0.8*   CALCIUM 10.0 10.1     Recent Labs     04/29/22  1825   AST 19   ALT 26   BILITOT <0.2   ALKPHOS 67     No results for input(s): INR in the last 72 hours. Recent Labs     04/30/22  0123 04/30/22  0631 04/30/22  0929   TROPONINI <0.01 <0.01 <0.01       Urinalysis:    No results found for:  Ink, BACTERIA, RBCUA, BLOODU, SPECGRAV, GLUCOSEU    Radiology:  XR CHEST (2 VW)   Final Result   The lungs are without acute focal process. There is no effusion or   pneumothorax. The cardiomediastinal silhouette is stable. The osseous   structures are stable. IMPRESSION:   No acute process. NM Cardiac Stress Test Nuclear Imaging    (Results Pending)           Assessment/Plan:    Active Hospital Problems    Diagnosis     Obesity (BMI 30-39. 9) [E66.9]      Priority: Medium    GERD (gastroesophageal reflux disease) [K21.9]      Priority: Medium    HLD (hyperlipidemia) [E78.5]      Priority: Medium    Chest pain in adult [R07.9]      Priority: Medium    Obstructive lung disease (generalized) ([facility] [address] [J44.9]      Priority: Medium     Chest Pain  -  Atypical for cardiac angina which is intermediate risk in a male over 40. This could be done as an inpatient or outpatient. Patient preferred to stay in the hospital to expedite testing.   -attempted to get stress test but unable due to system deficiencies  -CT coronary ordered     Obstructive lung disease  -  Patient has exam e/o expiratory airflow obstruction (wheezing, bronchospasm) at rest.  He relates a remote h/o exercise-induced asthma. Will make short acting bronchodilators available.   Will be sure to give an albuterol neb 30 minutes prior to exercise stress test.  -  Outpatient PFT's are recommended.     GERD, HLD  -  Continue home pantoprazole and statin. DVT Prophylaxis: lovenox  Diet: ADULT DIET;  Regular; Low Fat/Low Chol/High Fiber/KISHAN  Code Status: Full Code    PT/OT Eval Status: not orered    Dispo - pending workup, CT coronary    Jia Garcia MD

## 2022-04-30 NOTE — PROGRESS NOTES
4 Eyes Skin Assessment     The patient is being assess for   Admission    I agree that 2 RN's have performed a thorough Head to Toe Skin Assessment on the patient. ALL assessment sites listed below have been assessed. Areas assessed for pressure by both nurses:   [x]   Head, Face, and Ears   [x]   Shoulders, Back, and Chest, Abdomen  [x]   Arms, Elbows, and Hands   [x]   Coccyx, Sacrum, and Ischium  [x]   Legs, Feet, and Heels        Skin Assessed Under all Medical Devices by both nurses:        All Mepilex Borders were peeled back and area peeked at by both nurses:  Please list where Mepilex Borders are located:  n/a             **SHARE this note so that the co-signing nurse is able to place an eSignature**    Co-signer eSignature: Electronically signed by Abelardo Dinh RN on 4/30/22 at 2:47 AM EDT    Does the Patient have Skin Breakdown related to pressure?   No              Ashish Prevention initiated:  No   Wound Care Orders initiated:  No      WOC nurse consulted for Pressure Injury (Stage 3,4, Unstageable, DTI, NWPT, Complex wounds)and New or Established Ostomies:  No      Primary Nurse eSignature: Electronically signed by Manjit Fierro RN on 4/30/22 at 1:00 AM EDT

## 2022-04-30 NOTE — PROGRESS NOTES
Patient admitted to room 368, oriented to room, schedules, call light, patient NPO for stress test. Denies chest pain, VSS. Room independent. Will monitor.

## 2022-04-30 NOTE — DISCHARGE SUMMARY
Hospital Medicine Discharge Summary    Patient ID: Rodrigo Corbett      Patient's PCP: No primary care provider on file. Admit Date: 4/29/2022     Discharge Date: 4/30/2022      Admitting Provider: Katie Negrete MD     Discharge Provider: Maura Oliveros MD     Discharge Diagnoses: Active Hospital Problems    Diagnosis     Obesity (BMI 30-39. 9) [E66.9]      Priority: Medium    GERD (gastroesophageal reflux disease) [K21.9]      Priority: Medium    HLD (hyperlipidemia) [E78.5]      Priority: Medium    Chest pain in adult [R07.9]      Priority: Medium    Obstructive lung disease (generalized) ([facility] [address] [J44.9]      Priority: Medium       The patient was seen and examined on day of discharge and this discharge summary is in conjunction with any daily progress note from day of discharge. Hospital Course:     HISTORY OF PRESENT ILLNESS:    Rodrigo Corbett is a 49 y.o. male. He presented to the ER after completing his work day. He reports on and off mild central chest and neck discomfort for several days. Comes and goes spontaenously. It generally lasts for minutes but less than an hour. It does not change with activity, rest, breathing, swallowing, position, etc... Has not taken SL NTG. When it happens it feels like \"I can get just short of a good cleansing breath. \"  No flushing, diaphoresis, or nausea. Has tried albuterol which he states helps partially.     He may have had childhood asthma. He seems to recall definitely having exercise-induced asthma. He is not chronically treated for either. He had COVID-19 back in August, 2021. He had dyspnea, cough, and chest pain persisting long after COVID had resolved for which he was prescribed prn albuterol.   He has not had PFT's.         Chest Pain- resolved  -  Atypical for cardiac angina which is intermediate risk in a male over 36.  This could be done as an inpatient or outpatient.  Patient preferred to stay in the hospital to expedite testing.   -attempted to get stress test but unable due to system deficiencies  -outpt stress test ordered on dc     Obstructive lung disease  -  Patient has exam e/o expiratory airflow obstruction (wheezing, bronchospasm) at rest. Henrique Alford relates a remote h/o exercise-induced asthma.    -ordered short acting bronchodilators   -  Outpatient PFT's are recommended.     GERD, HLD  -  Continued home pantoprazole and statin. Physical Exam Performed:     /86   Pulse 79   Temp 97.7 °F (36.5 °C) (Oral)   Resp 16   Ht 5' 8\" (1.727 m)   Wt 215 lb (97.5 kg)   SpO2 95%   BMI 32.69 kg/m²       General appearance:  No apparent distress, appears stated age and cooperative. HEENT:  Normal cephalic, atraumatic without obvious deformity. Pupils equal, round, and reactive to light. Extra ocular muscles intact. Conjunctivae/corneas clear. Neck: Supple, with full range of motion. No jugular venous distention. Trachea midline. Respiratory:  Normal respiratory effort. Clear to auscultation, bilaterally without Rales/Wheezes/Rhonchi. Cardiovascular:  Regular rate and rhythm with normal S1/S2 without murmurs, rubs or gallops. Abdomen: Soft, non-tender, non-distended with normal bowel sounds. Musculoskeletal:  No clubbing, cyanosis or edema bilaterally. Full range of motion without deformity. Skin: Skin color, texture, turgor normal.  No rashes or lesions. Neurologic:  Neurovascularly intact without any focal sensory/motor deficits. Cranial nerves: II-XII intact, grossly non-focal.  Psychiatric:  Alert and oriented, thought content appropriate, normal insight  Capillary Refill: Brisk,< 3 seconds   Peripheral Pulses: +2 palpable, equal bilaterally       Labs:  For convenience and continuity at follow-up the following most recent labs are provided:      CBC:    Lab Results   Component Value Date    WBC 5.9 04/30/2022    HGB 14.8 04/30/2022    HCT 44.8 04/30/2022     04/30/2022       Renal:    Lab Results   Component Value Date     04/30/2022    K 4.9 04/30/2022     04/30/2022    CO2 28 04/30/2022    BUN 15 04/30/2022    CREATININE 0.8 04/30/2022    CALCIUM 10.1 04/30/2022         Significant Diagnostic Studies    Radiology:   XR CHEST (2 VW)   Final Result   The lungs are without acute focal process. There is no effusion or   pneumothorax. The cardiomediastinal silhouette is stable. The osseous   structures are stable. IMPRESSION:   No acute process. NM MYOCARDIAL SPECT REST EXERCISE OR RX    (Results Pending)          Consults:     IP CONSULT TO HOSPITALIST    Disposition:  home     Condition at Discharge: Stable    Discharge Instructions/Follow-up:  Pt instructed to obtain stress test on 5/2/22(given referral on dc) and f/u with PCP for results    Code Status:  Full Code     Activity: activity as tolerated    Diet: regular diet      Discharge Medications:     Discharge Medication List as of 4/30/2022  3:40 PM           Details   pantoprazole (PROTONIX) 40 MG tablet Take 40 mg by mouth dailyHistorical Med      aspirin 81 MG EC tablet Take 81 mg by mouth dailyHistorical Med      atorvastatin (LIPITOR) 20 MG tablet Take 20 mg by mouth dailyHistorical Med             Time Spent on discharge is more than 30 minutes in the examination, evaluation, counseling and review of medications and discharge plan. Signed:    Mary Malagon MD   4/30/2022      Thank you No primary care provider on file. for the opportunity to be involved in this patient's care. If you have any questions or concerns, please feel free to contact me at [phone].

## 2022-05-01 LAB
ESTIMATED AVERAGE GLUCOSE: 168.6 MG/DL
HBA1C MFR BLD: 7.5 %

## 2022-05-04 ENCOUNTER — HOSPITAL ENCOUNTER (OUTPATIENT)
Dept: NUCLEAR MEDICINE | Age: 57
Discharge: HOME | End: 2022-05-04
Payer: COMMERCIAL

## 2022-05-04 ENCOUNTER — HOSPITAL ENCOUNTER (OUTPATIENT)
Dept: NON INVASIVE DIAGNOSTICS | Age: 57
Discharge: HOME | End: 2022-05-04
Payer: COMMERCIAL

## 2022-05-04 DIAGNOSIS — R07.9 CHEST PAIN, UNSPECIFIED TYPE: ICD-10-CM

## 2022-05-04 LAB
LV EF: 70 %
LVEF MODALITY: NORMAL

## 2022-05-04 PROCEDURE — 78452 HT MUSCLE IMAGE SPECT MULT: CPT

## 2022-05-04 PROCEDURE — 3430000000 HC RX DIAGNOSTIC RADIOPHARMACEUTICAL: Performed by: INTERNAL MEDICINE

## 2022-05-04 PROCEDURE — A9502 TC99M TETROFOSMIN: HCPCS | Performed by: INTERNAL MEDICINE

## 2022-05-04 PROCEDURE — 93017 CV STRESS TEST TRACING ONLY: CPT

## 2022-05-04 RX ADMIN — TETROFOSMIN 10.5 MILLICURIE: 1.38 INJECTION, POWDER, LYOPHILIZED, FOR SOLUTION INTRAVENOUS at 09:36

## 2022-05-04 RX ADMIN — TETROFOSMIN 32 MILLICURIE: 1.38 INJECTION, POWDER, LYOPHILIZED, FOR SOLUTION INTRAVENOUS at 10:56

## 2022-05-04 NOTE — PROGRESS NOTES
A GXT Myoview stress test was completed on this patient as ordered. The patient tolerated the procedure well.